# Patient Record
Sex: MALE | Race: WHITE | NOT HISPANIC OR LATINO | Employment: UNEMPLOYED | URBAN - METROPOLITAN AREA
[De-identification: names, ages, dates, MRNs, and addresses within clinical notes are randomized per-mention and may not be internally consistent; named-entity substitution may affect disease eponyms.]

---
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
## 2022-10-21 ENCOUNTER — OFFICE VISIT (OUTPATIENT)
Dept: FAMILY MEDICINE CLINIC | Facility: CLINIC | Age: 22
End: 2022-10-21

## 2022-10-21 VITALS
HEIGHT: 68 IN | DIASTOLIC BLOOD PRESSURE: 77 MMHG | OXYGEN SATURATION: 97 % | SYSTOLIC BLOOD PRESSURE: 130 MMHG | WEIGHT: 174 LBS | BODY MASS INDEX: 26.37 KG/M2 | HEART RATE: 64 BPM | RESPIRATION RATE: 16 BRPM

## 2022-10-21 DIAGNOSIS — R21 RASH AND NONSPECIFIC SKIN ERUPTION: ICD-10-CM

## 2022-10-21 DIAGNOSIS — F41.9 ANXIETY: Primary | ICD-10-CM

## 2022-10-21 DIAGNOSIS — F84.0 AUTISM: ICD-10-CM

## 2022-10-21 DIAGNOSIS — F33.0 MAJOR DEPRESSIVE DISORDER, RECURRENT, MILD: ICD-10-CM

## 2022-10-21 PROCEDURE — 99203 OFFICE O/P NEW LOW 30 MIN: CPT | Performed by: NURSE PRACTITIONER

## 2022-10-21 RX ORDER — BUPROPION HYDROCHLORIDE 150 MG/1
TABLET ORAL EVERY 24 HOURS
COMMUNITY
Start: 2019-02-21 | End: 2022-10-21

## 2022-10-21 RX ORDER — SERTRALINE HYDROCHLORIDE 25 MG/1
TABLET, FILM COATED ORAL EVERY 24 HOURS
COMMUNITY
Start: 2018-05-31 | End: 2022-10-21

## 2022-10-21 NOTE — PROGRESS NOTES
Subjective   Francois Arshad is a 22 y.o. male.       HPI   Pt is re-estalishing to our office today; last seen in 2019.   Medical/social/family hx reviewed.   1) Anxiety/depression - was taking sertraline 25 mg daily; bupropion  mg daily.  Stopped these over 3 years ago.  Continues to have concern with depressed moods.  Declines any medication today; requests to see a psychiatrist.  Denies any SI or HI.   2) Autism - mom would like to see someone about re-evaluation.   3) Rash - coming and going hands and face.  Concerned about eczema.  When rash occurs he uses hydrocortisone and this helps to improve it.  May last a few days.  Would like to see Derm; declines medication today.  Does not have rash today.     The following portions of the patient's history were reviewed and updated as appropriate: allergies, current medications, past family history, past medical history, past social history, past surgical history and problem list.    Review of Systems   Constitutional: Negative for chills, fatigue and fever.   Respiratory: Negative for cough, chest tightness, shortness of breath and wheezing.    Cardiovascular: Negative for chest pain and palpitations.   Gastrointestinal: Negative for diarrhea, nausea and vomiting.   Genitourinary: Negative for dysuria, frequency, hematuria and urgency.   Skin: Positive for rash.   Psychiatric/Behavioral: Positive for depressed mood. Negative for self-injury and suicidal ideas. The patient is nervous/anxious.        Objective   Physical Exam  Vitals reviewed.   Constitutional:       General: He is not in acute distress.     Appearance: Normal appearance.   Cardiovascular:      Rate and Rhythm: Normal rate and regular rhythm.      Pulses: Normal pulses.      Heart sounds: Normal heart sounds. No murmur heard.  Pulmonary:      Effort: Pulmonary effort is normal. No respiratory distress.      Breath sounds: Normal breath sounds. No wheezing or rhonchi.   Chest:      Chest wall:  No tenderness.   Abdominal:      Tenderness: There is no right CVA tenderness or left CVA tenderness.   Skin:     General: Skin is warm and dry.      Findings: No erythema or rash.   Neurological:      General: No focal deficit present.      Mental Status: He is alert and oriented to person, place, and time.   Psychiatric:         Mood and Affect: Mood is anxious.         Speech: Speech normal.         Behavior: Behavior is cooperative.         Thought Content: Thought content does not include homicidal or suicidal ideation.           Assessment & Plan   Diagnoses and all orders for this visit:    1. Anxiety (Primary)  Comments:  Referral to psychiatry and counseling given.  Pt declines medication today.   Orders:  -     Ambulatory Referral to Psychiatry  -     Ambulatory Referral to Psychology    2. Major depressive disorder, recurrent, mild (HCC)  Comments:  Referral to psychiatry and counseling given.  Pt declines medication today.   Orders:  -     Ambulatory Referral to Psychiatry  -     Ambulatory Referral to Psychology    3. Rash and nonspecific skin eruption  Comments:  Referral given to dermatology.   Orders:  -     Ambulatory Referral to Dermatology    4. Autism  Comments:  Referral to psychology   Orders:  -     Ambulatory Referral to Psychiatry  -     Ambulatory Referral to Psychology

## 2023-11-20 ENCOUNTER — TELEPHONE (OUTPATIENT)
Dept: FAMILY MEDICINE CLINIC | Facility: CLINIC | Age: 23
End: 2023-11-20

## 2023-11-20 NOTE — TELEPHONE ENCOUNTER
I don not write letters for emotional support animals but can refer him to a specialist who may be able to do this?

## 2023-11-20 NOTE — TELEPHONE ENCOUNTER
Caller: MITCHEL SHUKLA    Relationship: Mother    Best call back number: [phone]    What form or medical record are you requesting: LETTER FOR EMOTIONAL SUPPORT ANIMAL    Who is requesting this form or medical record from you: APARTMENT    How would you like to receive the form or medical records (pick-up, mail, fax):

## 2025-05-05 NOTE — PROGRESS NOTES
Subjective   Francois Arshad is a 24 y.o. male.       HPI   Pt is here today for routine physical exam.   Medical/social/family hx reviewed.   Immunizations reviewed. Tdap due.   Non-smoker.   Current weight is 189; BMI 28.      Pt also here with concern of anxiety.    Mor anxious thoughts for past 3 months; mood is down.  Denies any SI or HI.  Failed Zoloft in the past.  Pt requests to try Wellbutrin. Pt is not interested in counseling.     The following portions of the patient's history were reviewed and updated as appropriate: allergies, current medications, past family history, past medical history, past social history, past surgical history, and problem list.    Review of Systems   Constitutional:  Negative for chills, fatigue and fever.   Eyes:  Negative for visual disturbance.   Respiratory:  Negative for cough, shortness of breath and wheezing.    Cardiovascular:  Negative for chest pain and palpitations.   Gastrointestinal:  Negative for abdominal pain, blood in stool, constipation, diarrhea, nausea, vomiting and GERD.   Genitourinary:  Negative for dysuria, frequency, hematuria and urgency.   Neurological:  Negative for dizziness, weakness, light-headedness and headache.   Psychiatric/Behavioral:  Positive for depressed mood. Negative for self-injury and suicidal ideas. The patient is nervous/anxious.        Objective   Physical Exam  Vitals reviewed.   Constitutional:       General: He is not in acute distress.     Appearance: Normal appearance.   HENT:      Head: Normocephalic and atraumatic.   Cardiovascular:      Rate and Rhythm: Normal rate and regular rhythm.      Pulses: Normal pulses.      Heart sounds: Normal heart sounds. No murmur heard.  Pulmonary:      Effort: Pulmonary effort is normal. No respiratory distress.      Breath sounds: Normal breath sounds. No wheezing, rhonchi or rales.   Chest:      Chest wall: No tenderness.   Abdominal:      Tenderness: There is no right CVA tenderness or  left CVA tenderness.   Musculoskeletal:      Cervical back: Normal range of motion.   Skin:     General: Skin is warm and dry.   Neurological:      General: No focal deficit present.      Mental Status: He is alert and oriented to person, place, and time.   Psychiatric:         Mood and Affect: Mood is anxious.         Speech: Speech normal.         Behavior: Behavior is cooperative.         Thought Content: Thought content does not include homicidal or suicidal ideation.                Procedures   Assessment & Plan   Diagnoses and all orders for this visit:    1. Health maintenance examination (Primary)  Comments:  Medical/social/family hx reviewed.   Immunizations reviewed; pt declines Tdap.   Pt declines lab work today.   Work on healthy diet; aim for 150 min exercise wk    2. Anxiety and depression  Comments:  Given Wellbutrin  mg daily.   Pt declines counseling.   RTO in 4 weeks.  Orders:  -     buPROPion XL (Wellbutrin XL) 150 MG 24 hr tablet; Take 1 tablet by mouth Daily.  Dispense: 30 tablet; Refill: 1

## 2025-05-06 ENCOUNTER — OFFICE VISIT (OUTPATIENT)
Dept: FAMILY MEDICINE CLINIC | Facility: CLINIC | Age: 25
End: 2025-05-06
Payer: COMMERCIAL

## 2025-05-06 VITALS
OXYGEN SATURATION: 98 % | DIASTOLIC BLOOD PRESSURE: 78 MMHG | WEIGHT: 189 LBS | HEIGHT: 68 IN | HEART RATE: 92 BPM | BODY MASS INDEX: 28.64 KG/M2 | SYSTOLIC BLOOD PRESSURE: 125 MMHG

## 2025-05-06 DIAGNOSIS — Z00.00 HEALTH MAINTENANCE EXAMINATION: Primary | ICD-10-CM

## 2025-05-06 DIAGNOSIS — F32.A ANXIETY AND DEPRESSION: ICD-10-CM

## 2025-05-06 DIAGNOSIS — F41.9 ANXIETY AND DEPRESSION: ICD-10-CM

## 2025-05-06 RX ORDER — BUPROPION HYDROCHLORIDE 150 MG/1
150 TABLET ORAL DAILY
Qty: 30 TABLET | Refills: 1 | Status: SHIPPED | OUTPATIENT
Start: 2025-05-06

## 2025-06-02 NOTE — PROGRESS NOTES
Subjective   Francois Arshad is a 24 y.o. male.       HPI   Pt is here today for 4 week follow up on depression/anxiety.   Currently on Wellbutrin  mg daily.   He feels moods are stable and anxiety is low.   Denies any SI or HI.    Sleep and appetite are normal.     The following portions of the patient's history were reviewed and updated as appropriate: allergies, current medications, past family history, past medical history, past social history, past surgical history, and problem list.    Review of Systems   Constitutional:  Negative for chills, fatigue and fever.   Respiratory:  Negative for cough and shortness of breath.    Cardiovascular:  Negative for chest pain and palpitations.   Psychiatric/Behavioral:  Negative for self-injury, sleep disturbance, suicidal ideas, depressed mood and stress. The patient is not nervous/anxious.        Objective   Physical Exam  Vitals reviewed.   Constitutional:       General: He is not in acute distress.     Appearance: Normal appearance.   Cardiovascular:      Rate and Rhythm: Normal rate and regular rhythm.      Pulses: Normal pulses.      Heart sounds: Normal heart sounds.   Pulmonary:      Effort: Pulmonary effort is normal. No respiratory distress.      Breath sounds: Normal breath sounds. No wheezing, rhonchi or rales.   Chest:      Chest wall: No tenderness.   Skin:     General: Skin is warm and dry.   Neurological:      General: No focal deficit present.      Mental Status: He is alert and oriented to person, place, and time.   Psychiatric:         Mood and Affect: Mood normal.         Speech: Speech normal.         Behavior: Behavior is cooperative.         Thought Content: Thought content does not include homicidal or suicidal ideation.                  Procedures   Assessment & Plan   Diagnoses and all orders for this visit:    1. Anxiety and depression (Primary)  Comments:  Stable.   Cont. current medication.   RTO in 6-12 months.  Orders:  -     buPROPion  XL (Wellbutrin XL) 150 MG 24 hr tablet; Take 1 tablet by mouth Daily.  Dispense: 90 tablet; Refill: 3

## 2025-06-04 ENCOUNTER — OFFICE VISIT (OUTPATIENT)
Dept: FAMILY MEDICINE CLINIC | Facility: CLINIC | Age: 25
End: 2025-06-04
Payer: COMMERCIAL

## 2025-06-04 VITALS
SYSTOLIC BLOOD PRESSURE: 118 MMHG | BODY MASS INDEX: 28.19 KG/M2 | DIASTOLIC BLOOD PRESSURE: 74 MMHG | WEIGHT: 186 LBS | HEIGHT: 68 IN | HEART RATE: 77 BPM | OXYGEN SATURATION: 96 %

## 2025-06-04 DIAGNOSIS — F32.A ANXIETY AND DEPRESSION: Primary | ICD-10-CM

## 2025-06-04 DIAGNOSIS — F41.9 ANXIETY AND DEPRESSION: Primary | ICD-10-CM

## 2025-06-04 PROCEDURE — 99213 OFFICE O/P EST LOW 20 MIN: CPT | Performed by: NURSE PRACTITIONER

## 2025-06-04 RX ORDER — BUPROPION HYDROCHLORIDE 150 MG/1
150 TABLET ORAL DAILY
Qty: 90 TABLET | Refills: 3 | Status: SHIPPED | OUTPATIENT
Start: 2025-06-04